# Patient Record
Sex: MALE | Race: ASIAN | NOT HISPANIC OR LATINO | URBAN - METROPOLITAN AREA
[De-identification: names, ages, dates, MRNs, and addresses within clinical notes are randomized per-mention and may not be internally consistent; named-entity substitution may affect disease eponyms.]

---

## 2020-01-28 PROBLEM — 40739000 DYSPHAGIA: Status: ACTIVE | Noted: 2020-01-28

## 2020-03-05 PROBLEM — 414916001 OBESITY: Status: ACTIVE | Noted: 2020-03-05

## 2020-06-30 ENCOUNTER — OFFICE VISIT (OUTPATIENT)
Dept: URBAN - METROPOLITAN AREA CLINIC 27 | Facility: CLINIC | Age: 45
End: 2020-06-30

## 2020-12-02 ENCOUNTER — OFFICE VISIT (OUTPATIENT)
Dept: URBAN - METROPOLITAN AREA CLINIC 27 | Facility: CLINIC | Age: 45
End: 2020-12-02

## 2021-01-14 ENCOUNTER — OFFICE VISIT (OUTPATIENT)
Dept: URBAN - METROPOLITAN AREA MEDICAL CENTER 8 | Facility: MEDICAL CENTER | Age: 46
End: 2021-01-14

## 2021-06-02 ENCOUNTER — OFFICE VISIT (OUTPATIENT)
Dept: URBAN - METROPOLITAN AREA CLINIC 27 | Facility: CLINIC | Age: 46
End: 2021-06-02

## 2021-06-02 PROBLEM — 70153002 HEMORRHOIDS: Status: ACTIVE | Noted: 2021-06-02

## 2021-06-02 PROBLEM — 266435005 GASTRO-ESOPHAGEAL REFLUX DISEASE WITHOUT ESOPHAGITIS: Status: ACTIVE | Noted: 2021-06-02

## 2021-06-25 ENCOUNTER — OFFICE VISIT (OUTPATIENT)
Dept: URBAN - METROPOLITAN AREA CLINIC 27 | Facility: CLINIC | Age: 46
End: 2021-06-25

## 2021-07-14 ENCOUNTER — OFFICE VISIT (OUTPATIENT)
Dept: URBAN - METROPOLITAN AREA CLINIC 27 | Facility: CLINIC | Age: 46
End: 2021-07-14

## 2021-08-04 ENCOUNTER — OFFICE VISIT (OUTPATIENT)
Dept: URBAN - METROPOLITAN AREA CLINIC 27 | Facility: CLINIC | Age: 46
End: 2021-08-04

## 2021-09-01 ENCOUNTER — OFFICE VISIT (OUTPATIENT)
Dept: URBAN - METROPOLITAN AREA CLINIC 27 | Facility: CLINIC | Age: 46
End: 2021-09-01

## 2022-04-30 ENCOUNTER — TELEPHONE ENCOUNTER (OUTPATIENT)
Dept: URBAN - METROPOLITAN AREA CLINIC 121 | Facility: CLINIC | Age: 47
End: 2022-04-30

## 2022-04-30 RX ORDER — LANSOPRAZOLE 30 MG/1
1 TABLET PO QAM TABLET, ORALLY DISINTEGRATING ORAL
OUTPATIENT
Start: 2020-09-25

## 2022-04-30 RX ORDER — AMITRIPTYLINE HYDROCHLORIDE 10 MG/1
1 TABLET PO QHS FOR 1WK THEN INCREASE TO 2 TABLET PO QHS TABLET, FILM COATED ORAL
OUTPATIENT
Start: 2020-05-22

## 2022-05-01 ENCOUNTER — TELEPHONE ENCOUNTER (OUTPATIENT)
Dept: URBAN - METROPOLITAN AREA CLINIC 121 | Facility: CLINIC | Age: 47
End: 2022-05-01

## 2022-05-01 RX ORDER — LANSOPRAZOLE
TAKE 10ML PO QD KIT
Status: ACTIVE | COMMUNITY
Start: 2020-04-20

## 2022-05-01 RX ORDER — LANSOPRAZOLE 30 MG/1
DISSOLVE 1 TABLET BY MOUTH EVERY MORNING TABLET, ORALLY DISINTEGRATING ORAL
Status: ACTIVE | COMMUNITY
Start: 2020-09-11

## 2022-05-01 RX ORDER — HYDROCORTISONE 25 MG/G
APPLY TO RECTUM WITH GLOVED FINGER BID CREAM TOPICAL
Status: ACTIVE | COMMUNITY
Start: 2020-09-25

## 2022-05-01 RX ORDER — LANSOPRAZOLE 30 MG/1
1 CAPSULE PO QAM CAPSULE, DELAYED RELEASE ORAL
Status: ACTIVE | COMMUNITY
Start: 2020-04-20

## 2022-05-01 RX ORDER — FAMOTIDINE 10 MG/1
TABLET, FILM COATED ORAL
Status: ACTIVE | COMMUNITY
Start: 2020-01-30

## 2022-05-01 RX ORDER — MOMETASONE FUROATE AND FORMOTEROL FUMARATE DIHYDRATE 200; 5 UG/1; UG/1
AEROSOL RESPIRATORY (INHALATION)
Status: ACTIVE | COMMUNITY
Start: 2020-01-30

## 2022-05-01 RX ORDER — PANTOPRAZOLE SODIUM 20 MG
1 TABLET PO QD TABLET, DELAYED RELEASE (ENTERIC COATED) ORAL
Status: ACTIVE | COMMUNITY
Start: 2020-03-30

## 2022-05-01 RX ORDER — AMITRIPTYLINE HYDROCHLORIDE 10 MG/1
2 TABLETS PO QHS TABLET, FILM COATED ORAL
Status: ACTIVE | COMMUNITY
Start: 2020-05-22

## 2022-05-09 ENCOUNTER — OFFICE VISIT (OUTPATIENT)
Dept: URBAN - METROPOLITAN AREA CLINIC 27 | Facility: CLINIC | Age: 47
End: 2022-05-09

## 2022-06-14 ENCOUNTER — OFFICE VISIT (OUTPATIENT)
Dept: URBAN - METROPOLITAN AREA CLINIC 27 | Facility: CLINIC | Age: 47
End: 2022-06-14

## 2022-06-14 NOTE — HPI-TODAY'S VISIT:
Patient is a 47 YOM established patient of Dr. Washington presenting for follow up of his hemorrhoids and GERD. He was last seen in 9/2021 for 4th round of hemorrhoid banding with Dr. Palomino. He was advised at that time that if bleeding were to recur he would need to see CRS for possible hemorrhoidectomy.  1/2021 Colonoscopy: small internal hemorrhoids; small TA; 5 yr recall 4/2020 EGD for dysphagia: empiric esophageal dilatation; eso biopsies unremarkable 1/2020 EGD for dysphagia: empiric esophageal dilatation; mild gastritis; esophageal biopsies unremarkable

## 2022-12-31 ENCOUNTER — WEB ENCOUNTER (OUTPATIENT)
Dept: URBAN - METROPOLITAN AREA CLINIC 27 | Facility: CLINIC | Age: 47
End: 2022-12-31

## 2023-01-11 ENCOUNTER — OFFICE VISIT (OUTPATIENT)
Dept: URBAN - METROPOLITAN AREA CLINIC 27 | Facility: CLINIC | Age: 48
End: 2023-01-11
Payer: COMMERCIAL

## 2023-01-11 DIAGNOSIS — K62.5 RECTAL BLEEDING: ICD-10-CM

## 2023-01-11 DIAGNOSIS — J45.909 ASTHMA, UNSPECIFIED ASTHMA SEVERITY, UNSPECIFIED WHETHER COMPLICATED, UNSPECIFIED WHETHER PERSISTENT: ICD-10-CM

## 2023-01-11 DIAGNOSIS — K21.00 ALKALINE REFLUX ESOPHAGITIS: ICD-10-CM

## 2023-01-11 PROCEDURE — 99214 OFFICE O/P EST MOD 30 MIN: CPT | Performed by: INTERNAL MEDICINE

## 2023-01-11 RX ORDER — LANSOPRAZOLE
TAKE 10ML PO QD KIT
Status: ACTIVE | COMMUNITY
Start: 2020-04-20

## 2023-01-11 RX ORDER — MOMETASONE FUROATE AND FORMOTEROL FUMARATE DIHYDRATE 200; 5 UG/1; UG/1
AEROSOL RESPIRATORY (INHALATION)
Status: ACTIVE | COMMUNITY
Start: 2020-01-30

## 2023-01-11 RX ORDER — LANSOPRAZOLE 30 MG/1
1 CAPSULE PO QAM CAPSULE, DELAYED RELEASE ORAL
Status: ACTIVE | COMMUNITY
Start: 2020-04-20

## 2023-01-11 RX ORDER — PANTOPRAZOLE SODIUM 20 MG
1 TABLET PO QD TABLET, DELAYED RELEASE (ENTERIC COATED) ORAL
Status: ACTIVE | COMMUNITY
Start: 2020-03-30

## 2023-01-11 RX ORDER — LANSOPRAZOLE 30 MG/1
DISSOLVE 1 TABLET BY MOUTH EVERY MORNING TABLET, ORALLY DISINTEGRATING ORAL
Status: ACTIVE | COMMUNITY
Start: 2020-09-11

## 2023-01-11 RX ORDER — AMITRIPTYLINE HYDROCHLORIDE 10 MG/1
2 TABLETS PO QHS TABLET, FILM COATED ORAL
Status: ACTIVE | COMMUNITY
Start: 2020-05-22

## 2023-01-11 RX ORDER — FAMOTIDINE 10 MG/1
TABLET, FILM COATED ORAL
Status: ACTIVE | COMMUNITY
Start: 2020-01-30

## 2023-01-11 RX ORDER — HYDROCORTISONE 25 MG/G
APPLY TO RECTUM WITH GLOVED FINGER BID CREAM TOPICAL
Status: ACTIVE | COMMUNITY
Start: 2020-09-25

## 2023-01-11 NOTE — HPI-TODAY'S VISIT:
Mr. Shipman is a 47-year-old established patient who presents today to discuss starting the evaluation for acid reflux surgery. He has suboptimally controlled reflux despite taking an Omeprazole 20 mg 1-2 times per day and liquid Pepcid bid.  He tried other PPIs in the past to no avail. In addition, his pulmonologist believes the suboptimally reflux is affecting his asthma. He has occasional dysphagia to pills and food. He does not have to regurgitate the food bolus. He was dilated in Jan 2020 and April 2020. The dysphagia occurs once or twice a month. He has hemorrhoids with a history of banding x 3 in 2021. He continues to have rectal bleeding once a month. He reports red blood per rectum that requires him to apply pressure with the toilet tissue to stop the bleeding. He denies hard stools or straining. His last colonoscopy in 2021 was significant for a tubular adenoma in the cecum and small internal hemorrhoids.

## 2023-01-20 PROBLEM — 195967001: Status: ACTIVE | Noted: 2023-01-20

## 2023-01-20 PROBLEM — 40739000: Status: ACTIVE | Noted: 2023-01-20

## 2023-01-26 ENCOUNTER — TELEPHONE ENCOUNTER (OUTPATIENT)
Dept: URBAN - METROPOLITAN AREA CLINIC 27 | Facility: CLINIC | Age: 48
End: 2023-01-26

## 2023-02-07 ENCOUNTER — WEB ENCOUNTER (OUTPATIENT)
Dept: URBAN - METROPOLITAN AREA CLINIC 27 | Facility: CLINIC | Age: 48
End: 2023-02-07

## 2023-02-08 ENCOUNTER — OFFICE VISIT (OUTPATIENT)
Dept: URBAN - METROPOLITAN AREA CLINIC 27 | Facility: CLINIC | Age: 48
End: 2023-02-08

## 2023-02-10 PROBLEM — 235595009: Status: ACTIVE | Noted: 2023-01-20

## 2023-03-05 ENCOUNTER — WEB ENCOUNTER (OUTPATIENT)
Dept: URBAN - METROPOLITAN AREA CLINIC 27 | Facility: CLINIC | Age: 48
End: 2023-03-05

## 2023-03-06 ENCOUNTER — WEB ENCOUNTER (OUTPATIENT)
Dept: URBAN - METROPOLITAN AREA CLINIC 27 | Facility: CLINIC | Age: 48
End: 2023-03-06

## 2023-03-21 ENCOUNTER — WEB ENCOUNTER (OUTPATIENT)
Dept: URBAN - METROPOLITAN AREA CLINIC 27 | Facility: CLINIC | Age: 48
End: 2023-03-21

## 2023-03-22 ENCOUNTER — WEB ENCOUNTER (OUTPATIENT)
Dept: URBAN - METROPOLITAN AREA CLINIC 27 | Facility: CLINIC | Age: 48
End: 2023-03-22

## 2023-04-07 ENCOUNTER — OFFICE VISIT (OUTPATIENT)
Dept: URBAN - METROPOLITAN AREA MEDICAL CENTER 8 | Facility: MEDICAL CENTER | Age: 48
End: 2023-04-07
Payer: COMMERCIAL

## 2023-04-07 DIAGNOSIS — K29.60 ADENOPAPILLOMATOSIS GASTRICA: ICD-10-CM

## 2023-04-07 DIAGNOSIS — K21.9 ACID REFLUX: ICD-10-CM

## 2023-04-07 DIAGNOSIS — K44.9 DIAPHRAGMATIC HERNIA: ICD-10-CM

## 2023-04-07 PROCEDURE — 91035 G-ESOPH REFLX TST W/ELECTROD: CPT | Performed by: INTERNAL MEDICINE

## 2023-04-07 PROCEDURE — 43239 EGD BIOPSY SINGLE/MULTIPLE: CPT | Performed by: INTERNAL MEDICINE

## 2023-04-07 PROCEDURE — 91010 ESOPHAGUS MOTILITY STUDY: CPT | Performed by: INTERNAL MEDICINE

## 2023-04-07 RX ORDER — FAMOTIDINE 10 MG/1
TABLET, FILM COATED ORAL
Status: ACTIVE | COMMUNITY
Start: 2020-01-30

## 2023-04-07 RX ORDER — PANTOPRAZOLE SODIUM 20 MG
1 TABLET PO QD TABLET, DELAYED RELEASE (ENTERIC COATED) ORAL
Status: ACTIVE | COMMUNITY
Start: 2020-03-30

## 2023-04-07 RX ORDER — LANSOPRAZOLE
TAKE 10ML PO QD KIT
Status: ACTIVE | COMMUNITY
Start: 2020-04-20

## 2023-04-07 RX ORDER — AMITRIPTYLINE HYDROCHLORIDE 10 MG/1
2 TABLETS PO QHS TABLET, FILM COATED ORAL
Status: ACTIVE | COMMUNITY
Start: 2020-05-22

## 2023-04-07 RX ORDER — HYDROCORTISONE 25 MG/G
APPLY TO RECTUM WITH GLOVED FINGER BID CREAM TOPICAL
Status: ACTIVE | COMMUNITY
Start: 2020-09-25

## 2023-04-07 RX ORDER — MOMETASONE FUROATE AND FORMOTEROL FUMARATE DIHYDRATE 200; 5 UG/1; UG/1
AEROSOL RESPIRATORY (INHALATION)
Status: ACTIVE | COMMUNITY
Start: 2020-01-30

## 2023-04-07 RX ORDER — LANSOPRAZOLE 30 MG/1
DISSOLVE 1 TABLET BY MOUTH EVERY MORNING TABLET, ORALLY DISINTEGRATING ORAL
Status: ACTIVE | COMMUNITY
Start: 2020-09-11

## 2023-04-07 RX ORDER — LANSOPRAZOLE 30 MG/1
1 CAPSULE PO QAM CAPSULE, DELAYED RELEASE ORAL
Status: ACTIVE | COMMUNITY
Start: 2020-04-20

## 2023-04-12 ENCOUNTER — WEB ENCOUNTER (OUTPATIENT)
Dept: URBAN - METROPOLITAN AREA CLINIC 27 | Facility: CLINIC | Age: 48
End: 2023-04-12

## 2023-04-14 ENCOUNTER — WEB ENCOUNTER (OUTPATIENT)
Dept: URBAN - METROPOLITAN AREA CLINIC 27 | Facility: CLINIC | Age: 48
End: 2023-04-14

## 2023-05-04 ENCOUNTER — OFFICE VISIT (OUTPATIENT)
Dept: URBAN - METROPOLITAN AREA CLINIC 27 | Facility: CLINIC | Age: 48
End: 2023-05-04
Payer: COMMERCIAL

## 2023-05-04 ENCOUNTER — TELEPHONE ENCOUNTER (OUTPATIENT)
Dept: URBAN - METROPOLITAN AREA CLINIC 27 | Facility: CLINIC | Age: 48
End: 2023-05-04

## 2023-05-04 VITALS
HEART RATE: 76 BPM | WEIGHT: 230 LBS | HEIGHT: 72 IN | SYSTOLIC BLOOD PRESSURE: 129 MMHG | RESPIRATION RATE: 17 BRPM | DIASTOLIC BLOOD PRESSURE: 80 MMHG | BODY MASS INDEX: 31.15 KG/M2

## 2023-05-04 DIAGNOSIS — J45.909 ASTHMA, UNSPECIFIED ASTHMA SEVERITY, UNSPECIFIED WHETHER COMPLICATED, UNSPECIFIED WHETHER PERSISTENT: ICD-10-CM

## 2023-05-04 DIAGNOSIS — K21.9 GASTRO-ESOPHAGEAL REFLUX DISEASE WITHOUT ESOPHAGITIS: ICD-10-CM

## 2023-05-04 PROCEDURE — 99214 OFFICE O/P EST MOD 30 MIN: CPT | Performed by: INTERNAL MEDICINE

## 2023-05-04 RX ORDER — FAMOTIDINE 10 MG/1
TABLET, FILM COATED ORAL
Status: ACTIVE | COMMUNITY
Start: 2020-01-30

## 2023-05-04 RX ORDER — LANSOPRAZOLE 30 MG/1
1 CAPSULE PO QAM CAPSULE, DELAYED RELEASE ORAL
Status: ACTIVE | COMMUNITY
Start: 2020-04-20

## 2023-05-04 RX ORDER — PANTOPRAZOLE SODIUM 20 MG
1 TABLET PO QD TABLET, DELAYED RELEASE (ENTERIC COATED) ORAL
Status: ACTIVE | COMMUNITY
Start: 2020-03-30

## 2023-05-04 RX ORDER — HYDROCORTISONE 25 MG/G
APPLY TO RECTUM WITH GLOVED FINGER BID CREAM TOPICAL
Status: ACTIVE | COMMUNITY
Start: 2020-09-25

## 2023-05-04 RX ORDER — MOMETASONE FUROATE AND FORMOTEROL FUMARATE DIHYDRATE 200; 5 UG/1; UG/1
AEROSOL RESPIRATORY (INHALATION)
Status: ACTIVE | COMMUNITY
Start: 2020-01-30

## 2023-05-04 RX ORDER — LANSOPRAZOLE 30 MG/1
DISSOLVE 1 TABLET BY MOUTH EVERY MORNING TABLET, ORALLY DISINTEGRATING ORAL
Status: ACTIVE | COMMUNITY
Start: 2020-09-11

## 2023-05-04 RX ORDER — LANSOPRAZOLE
TAKE 10ML PO QD KIT
Status: ACTIVE | COMMUNITY
Start: 2020-04-20

## 2023-05-04 RX ORDER — AMITRIPTYLINE HYDROCHLORIDE 10 MG/1
2 TABLETS PO QHS TABLET, FILM COATED ORAL
Status: ACTIVE | COMMUNITY
Start: 2020-05-22

## 2023-05-04 NOTE — HPI-TODAY'S VISIT:
This is a 48-year-old male seen for consultation for his reflux.  He continues to have symptoms despite treatment as well as symptoms of asthma.  He had dysphagia but that has resolved.  He is concerned about food allergies he states that he is inability to tolerate wheat dairy eggs now soy and pears and coconut.  He is followed by Dr. Arboleda who feels that this is all mild and is continuing treatment.  He recently underwent EGD with manometry and pH study.  He would like to consider surgical evaluation for his reflux.

## 2023-05-05 ENCOUNTER — DASHBOARD ENCOUNTERS (OUTPATIENT)
Age: 48
End: 2023-05-05

## 2024-09-16 ENCOUNTER — TELEPHONE ENCOUNTER (OUTPATIENT)
Dept: URBAN - METROPOLITAN AREA CLINIC 27 | Facility: CLINIC | Age: 49
End: 2024-09-16

## 2024-09-23 ENCOUNTER — OFFICE VISIT (OUTPATIENT)
Dept: URBAN - METROPOLITAN AREA CLINIC 27 | Facility: CLINIC | Age: 49
End: 2024-09-23
Payer: COMMERCIAL

## 2024-09-23 VITALS
HEIGHT: 72 IN | BODY MASS INDEX: 30.34 KG/M2 | DIASTOLIC BLOOD PRESSURE: 87 MMHG | SYSTOLIC BLOOD PRESSURE: 123 MMHG | WEIGHT: 224 LBS | HEART RATE: 77 BPM

## 2024-09-23 DIAGNOSIS — R13.19 CERVICAL DYSPHAGIA: ICD-10-CM

## 2024-09-23 DIAGNOSIS — J45.909 ASTHMA, UNSPECIFIED ASTHMA SEVERITY, UNSPECIFIED WHETHER COMPLICATED, UNSPECIFIED WHETHER PERSISTENT: ICD-10-CM

## 2024-09-23 DIAGNOSIS — K21.9 GASTRO-ESOPHAGEAL REFLUX DISEASE WITHOUT ESOPHAGITIS: ICD-10-CM

## 2024-09-23 PROCEDURE — 99214 OFFICE O/P EST MOD 30 MIN: CPT | Performed by: INTERNAL MEDICINE

## 2024-09-23 RX ORDER — LANSOPRAZOLE 30 MG/1
DISSOLVE 1 TABLET BY MOUTH EVERY MORNING TABLET, ORALLY DISINTEGRATING ORAL
Status: DISCONTINUED | COMMUNITY
Start: 2020-09-11

## 2024-09-23 RX ORDER — AMITRIPTYLINE HYDROCHLORIDE 10 MG/1
2 TABLETS PO QHS TABLET, FILM COATED ORAL
Status: DISCONTINUED | COMMUNITY
Start: 2020-05-22

## 2024-09-23 RX ORDER — MONTELUKAST SODIUM 5 MG/1
TABLET, CHEWABLE ORAL
Qty: 180 EACH | Status: ACTIVE | COMMUNITY

## 2024-09-23 RX ORDER — MOMETASONE FUROATE AND FORMOTEROL FUMARATE DIHYDRATE 200; 5 UG/1; UG/1
AEROSOL RESPIRATORY (INHALATION)
Status: DISCONTINUED | COMMUNITY
Start: 2020-01-30

## 2024-09-23 RX ORDER — LEVALBUTEROL TARTRATE 45 UG/1
AEROSOL, METERED ORAL
Qty: 45 GRAM | Status: ACTIVE | COMMUNITY

## 2024-09-23 RX ORDER — HYDROCORTISONE 25 MG/G
APPLY TO RECTUM WITH GLOVED FINGER BID CREAM TOPICAL
Status: DISCONTINUED | COMMUNITY
Start: 2020-09-25

## 2024-09-23 RX ORDER — PANTOPRAZOLE SODIUM 20 MG
1 TABLET PO QD TABLET, DELAYED RELEASE (ENTERIC COATED) ORAL
Status: DISCONTINUED | COMMUNITY
Start: 2020-03-30

## 2024-09-23 RX ORDER — LANSOPRAZOLE
TAKE 10ML PO QD KIT
Status: DISCONTINUED | COMMUNITY
Start: 2020-04-20

## 2024-09-23 RX ORDER — LANSOPRAZOLE 30 MG/1
1 CAPSULE PO QAM CAPSULE, DELAYED RELEASE ORAL
Status: DISCONTINUED | COMMUNITY
Start: 2020-04-20

## 2024-09-23 RX ORDER — FAMOTIDINE 10 MG/1
TABLET, FILM COATED ORAL
Status: DISCONTINUED | COMMUNITY
Start: 2020-01-30

## 2024-09-23 RX ORDER — EPINEPHRINE 0.3 MG/.3ML
INJECTION SUBCUTANEOUS
Qty: 2 EACH | Status: ACTIVE | COMMUNITY

## 2024-09-23 RX ORDER — BUDESONIDE, GLYCOPYRROLATE, AND FORMOTEROL FUMARATE 160; 9; 4.8 UG/1; UG/1; UG/1
AEROSOL, METERED RESPIRATORY (INHALATION)
Qty: 32.1 GRAM | Status: ACTIVE | COMMUNITY

## 2024-09-23 RX ORDER — OMALIZUMAB 150 MG/ML
INJECTION, SOLUTION SUBCUTANEOUS
Qty: 2 MILLILITER | Status: ACTIVE | COMMUNITY

## 2024-09-23 NOTE — HPI-ZZZTODAY'S VISIT
Mr. Shipman is a 49-year-old male patient of Dr. Washington here for evaluation of possible esophagitis after being on immunotherapy.  He was last seen 1 year ago with acid reflux, underwent EGD [facility] and manometry at that time.  He has extensive food allergies and prior h/o EoE, though not present on last EGD. He has been doing oral immunotherapy for dairy, recently increased his dose and is now having intermittent dysphagia and substernal discomfort. He is taking OTC omeprazole QD, has stopped OIT until issue is sorted. He is hoping that if EoE is present that he may be a candidate for Dupixent.  He is on multiple medications for asthma. . EGD Bravo 4/2023:Mild gastritis, small HH, gastric and esophageal biopsies unremarkable; pH study positive distal esophageal acid exposure; manometry shows Short LES Colonoscopy 2021:1 small polyp, small internal hemorrhoids; recall 5 years

## 2024-09-27 ENCOUNTER — OFFICE VISIT (OUTPATIENT)
Dept: URBAN - METROPOLITAN AREA SURGERY CENTER 7 | Facility: SURGERY CENTER | Age: 49
End: 2024-09-27

## 2024-09-27 ENCOUNTER — TELEPHONE ENCOUNTER (OUTPATIENT)
Dept: URBAN - METROPOLITAN AREA CLINIC 27 | Facility: CLINIC | Age: 49
End: 2024-09-27

## 2024-09-27 ENCOUNTER — WEB ENCOUNTER (OUTPATIENT)
Dept: URBAN - METROPOLITAN AREA CLINIC 27 | Facility: CLINIC | Age: 49
End: 2024-09-27

## 2024-10-02 ENCOUNTER — WEB ENCOUNTER (OUTPATIENT)
Dept: URBAN - METROPOLITAN AREA CLINIC 27 | Facility: CLINIC | Age: 49
End: 2024-10-02

## 2024-10-03 ENCOUNTER — WEB ENCOUNTER (OUTPATIENT)
Dept: URBAN - METROPOLITAN AREA CLINIC 27 | Facility: CLINIC | Age: 49
End: 2024-10-03

## 2024-10-04 ENCOUNTER — WEB ENCOUNTER (OUTPATIENT)
Dept: URBAN - METROPOLITAN AREA CLINIC 27 | Facility: CLINIC | Age: 49
End: 2024-10-04

## 2024-10-08 ENCOUNTER — CLAIMS CREATED FROM THE CLAIM WINDOW (OUTPATIENT)
Dept: URBAN - METROPOLITAN AREA CLINIC 4 | Facility: CLINIC | Age: 49
End: 2024-10-08
Payer: COMMERCIAL

## 2024-10-08 ENCOUNTER — CLAIMS CREATED FROM THE CLAIM WINDOW (OUTPATIENT)
Dept: URBAN - METROPOLITAN AREA SURGERY CENTER 7 | Facility: SURGERY CENTER | Age: 49
End: 2024-10-08
Payer: COMMERCIAL

## 2024-10-08 DIAGNOSIS — K22.89 OTHER SPECIFIED DISEASE OF ESOPHAGUS: ICD-10-CM

## 2024-10-08 DIAGNOSIS — K20.0 EOSINOPHILIC ESOPHAGITIS: ICD-10-CM

## 2024-10-08 DIAGNOSIS — K21.9 ACID REFLUX: ICD-10-CM

## 2024-10-08 DIAGNOSIS — K31.89 MUCOSAL ABNORMALITY OF STOMACH: ICD-10-CM

## 2024-10-08 DIAGNOSIS — K21.9 GERD: ICD-10-CM

## 2024-10-08 DIAGNOSIS — K29.60 OTHER GASTRITIS WITHOUT BLEEDING: ICD-10-CM

## 2024-10-08 DIAGNOSIS — K29.70 GASTRITIS, UNSPECIFIED, WITHOUT BLEEDING: ICD-10-CM

## 2024-10-08 DIAGNOSIS — K21.9 GASTRO-ESOPHAGEAL REFLUX DISEASE WITHOUT ESOPHAGITIS: ICD-10-CM

## 2024-10-08 PROCEDURE — 88312 SPECIAL STAINS GROUP 1: CPT | Performed by: PATHOLOGY

## 2024-10-08 PROCEDURE — 43239 EGD BIOPSY SINGLE/MULTIPLE: CPT | Performed by: INTERNAL MEDICINE

## 2024-10-08 PROCEDURE — 00731 ANES UPR GI NDSC PX NOS: CPT | Performed by: NURSE ANESTHETIST, CERTIFIED REGISTERED

## 2024-10-08 PROCEDURE — 88305 TISSUE EXAM BY PATHOLOGIST: CPT | Performed by: PATHOLOGY

## 2024-10-08 RX ORDER — MONTELUKAST SODIUM 5 MG/1
TABLET, CHEWABLE ORAL
Qty: 180 EACH | Status: ACTIVE | COMMUNITY

## 2024-10-08 RX ORDER — BUDESONIDE, GLYCOPYRROLATE, AND FORMOTEROL FUMARATE 160; 9; 4.8 UG/1; UG/1; UG/1
AEROSOL, METERED RESPIRATORY (INHALATION)
Qty: 32.1 GRAM | Status: ACTIVE | COMMUNITY

## 2024-10-08 RX ORDER — EPINEPHRINE 0.3 MG/.3ML
INJECTION SUBCUTANEOUS
Qty: 2 EACH | Status: ACTIVE | COMMUNITY

## 2024-10-08 RX ORDER — OMALIZUMAB 150 MG/ML
INJECTION, SOLUTION SUBCUTANEOUS
Qty: 2 MILLILITER | Status: ACTIVE | COMMUNITY

## 2024-10-08 RX ORDER — LEVALBUTEROL TARTRATE 45 UG/1
AEROSOL, METERED ORAL
Qty: 45 GRAM | Status: ACTIVE | COMMUNITY

## 2024-10-17 ENCOUNTER — WEB ENCOUNTER (OUTPATIENT)
Dept: URBAN - METROPOLITAN AREA CLINIC 27 | Facility: CLINIC | Age: 49
End: 2024-10-17